# Patient Record
Sex: FEMALE | Race: WHITE | Employment: FULL TIME | URBAN - METROPOLITAN AREA
[De-identification: names, ages, dates, MRNs, and addresses within clinical notes are randomized per-mention and may not be internally consistent; named-entity substitution may affect disease eponyms.]

---

## 2017-09-07 ENCOUNTER — HOSPITAL ENCOUNTER (OUTPATIENT)
Dept: CT IMAGING | Age: 42
Discharge: HOME | End: 2017-09-07
Attending: INTERNAL MEDICINE
Payer: COMMERCIAL

## 2017-09-07 DIAGNOSIS — R91.1 PULMONARY NODULE: ICD-10-CM

## 2017-09-07 PROCEDURE — 71250 CT THORAX DX C-: CPT

## 2019-09-10 ENCOUNTER — HOSPITAL ENCOUNTER (OUTPATIENT)
Dept: CT IMAGING | Age: 44
Discharge: HOME | End: 2019-09-10
Attending: INTERNAL MEDICINE
Payer: COMMERCIAL

## 2019-09-10 DIAGNOSIS — R91.1 PULMONARY NODULE: ICD-10-CM

## 2019-09-10 PROCEDURE — 71250 CT THORAX DX C-: CPT

## 2020-09-14 ENCOUNTER — HOSPITAL ENCOUNTER (OUTPATIENT)
Dept: CT IMAGING | Age: 45
Discharge: HOME | End: 2020-09-14
Attending: INTERNAL MEDICINE
Payer: COMMERCIAL

## 2020-09-14 DIAGNOSIS — R91.1 PULMONARY NODULE: ICD-10-CM

## 2020-09-14 PROCEDURE — 71250 CT THORAX DX C-: CPT

## 2024-12-31 ENCOUNTER — TELEPHONE (OUTPATIENT)
Age: 49
End: 2024-12-31

## 2024-12-31 NOTE — TELEPHONE ENCOUNTER
Patient's mother, father and brother are patient's of Dr. Treviño.  (Lillian Mendoza, LISA Mendoza and Zbigniew Mendoza).  Patient would like to know if Dr. Treviño will accept her as a new patient.    Lillian Mendoza - [phone]

## 2025-01-01 ENCOUNTER — APPOINTMENT (OUTPATIENT)
Facility: HOSPITAL | Age: 50
End: 2025-01-01
Payer: COMMERCIAL

## 2025-01-01 ENCOUNTER — HOSPITAL ENCOUNTER (OUTPATIENT)
Facility: HOSPITAL | Age: 50
Setting detail: OBSERVATION
Discharge: HOME | End: 2025-01-02
Attending: STUDENT IN AN ORGANIZED HEALTH CARE EDUCATION/TRAINING PROGRAM | Admitting: FAMILY MEDICINE
Payer: COMMERCIAL

## 2025-01-01 DIAGNOSIS — R29.90 STROKE-LIKE SYMPTOMS: Primary | ICD-10-CM

## 2025-01-01 LAB
ALBUMIN SERPL-MCNC: 4 G/DL (ref 3.5–5)
ALBUMIN/GLOB SERPL: 1.3 (ref 1.1–2.2)
ALP SERPL-CCNC: 86 U/L (ref 45–117)
ALT SERPL-CCNC: 40 U/L (ref 12–78)
AST SERPL-CCNC: 31 U/L (ref 15–37)
BASOPHILS # BLD: 0 K/UL (ref 0–0.1)
BASOPHILS NFR BLD: 0 % (ref 0–1)
BILIRUB SERPL-MCNC: 0.4 MG/DL (ref 0.2–1)
BUN SERPL-MCNC: 17 MG/DL (ref 6–20)
BUN/CREAT SERPL: 20 (ref 12–20)
CALCIUM SERPL-MCNC: 9.6 MG/DL (ref 8.5–10.1)
CHLORIDE SERPL-SCNC: 104 MMOL/L (ref 97–108)
CO2 SERPL-SCNC: 30 MMOL/L (ref 21–32)
COMMENT:: NORMAL
CREAT SERPL-MCNC: 0.86 MG/DL (ref 0.55–1.02)
DIFFERENTIAL METHOD BLD: ABNORMAL
EOSINOPHIL NFR BLD: 1 % (ref 0–7)
ERYTHROCYTE [DISTWIDTH] IN BLOOD BY AUTOMATED COUNT: 11.9 % (ref 11.5–14.5)
GLUCOSE BLD STRIP.AUTO-MCNC: 134 MG/DL (ref 65–117)
GLUCOSE SERPL-MCNC: 124 MG/DL (ref 65–100)
HCT VFR BLD AUTO: 40 % (ref 35–47)
HGB BLD-MCNC: 13.5 G/DL (ref 11.5–16)
IMM GRANULOCYTES NFR BLD AUTO: 0 % (ref 0–0.5)
INR PPP: 1 (ref 0.9–1.1)
LYMPHOCYTES # BLD: 1.9 K/UL (ref 0.8–3.5)
LYMPHOCYTES NFR BLD: 31 % (ref 12–49)
MCH RBC QN AUTO: 29.9 PG (ref 26–34)
MCHC RBC AUTO-ENTMCNC: 33.8 G/DL (ref 30–36.5)
MCV RBC AUTO: 88.7 FL (ref 80–99)
MONOCYTES # BLD: 0.3 K/UL (ref 0–1)
MONOCYTES NFR BLD: 4 % (ref 5–13)
NEUTS SEG # BLD: 3.9 K/UL (ref 1.8–8)
NEUTS SEG NFR BLD: 64 % (ref 32–75)
NRBC # BLD: 0 K/UL (ref 0–0.01)
NRBC BLD-RTO: 0 PER 100 WBC
PMV BLD AUTO: 9.5 FL (ref 8.9–12.9)
POTASSIUM SERPL-SCNC: 3.6 MMOL/L (ref 3.5–5.1)
PROTHROMBIN TIME: 10.2 SEC (ref 9–11.1)
SERVICE CMNT-IMP: ABNORMAL
SODIUM SERPL-SCNC: 139 MMOL/L (ref 136–145)
SPECIMEN HOLD: NORMAL
T4 FREE SERPL-MCNC: 0.7 NG/DL (ref 0.8–1.5)
TROPONIN I SERPL HS-MCNC: 24 NG/L (ref 0–51)
TSH SERPL DL<=0.05 MIU/L-ACNC: 4.8 UIU/ML (ref 0.36–3.74)
WBC # BLD AUTO: 6.2 K/UL (ref 3.6–11)

## 2025-01-01 PROCEDURE — 84443 ASSAY THYROID STIM HORMONE: CPT

## 2025-01-01 PROCEDURE — APPNB45 APP NON BILLABLE 31-45 MINUTES

## 2025-01-01 PROCEDURE — 6360000004 HC RX CONTRAST MEDICATION: Performed by: RADIOLOGY

## 2025-01-01 PROCEDURE — 36415 COLL VENOUS BLD VENIPUNCTURE: CPT

## 2025-01-01 PROCEDURE — 70450 CT HEAD/BRAIN W/O DYE: CPT

## 2025-01-01 PROCEDURE — 84484 ASSAY OF TROPONIN QUANT: CPT

## 2025-01-01 PROCEDURE — 80053 COMPREHEN METABOLIC PANEL: CPT

## 2025-01-01 PROCEDURE — 0042T CT BRAIN PERFUSION: CPT

## 2025-01-01 PROCEDURE — 84439 ASSAY OF FREE THYROXINE: CPT

## 2025-01-01 PROCEDURE — 82962 GLUCOSE BLOOD TEST: CPT

## 2025-01-01 PROCEDURE — 99285 EMERGENCY DEPT VISIT HI MDM: CPT

## 2025-01-01 PROCEDURE — 70498 CT ANGIOGRAPHY NECK: CPT

## 2025-01-01 PROCEDURE — 85025 COMPLETE CBC W/AUTO DIFF WBC: CPT

## 2025-01-01 PROCEDURE — 85610 PROTHROMBIN TIME: CPT

## 2025-01-01 RX ORDER — VENLAFAXINE HYDROCHLORIDE 37.5 MG/1
37.5 CAPSULE, EXTENDED RELEASE ORAL DAILY
COMMUNITY

## 2025-01-01 RX ORDER — IOPAMIDOL 755 MG/ML
100 INJECTION, SOLUTION INTRAVASCULAR
Status: COMPLETED | OUTPATIENT
Start: 2025-01-01 | End: 2025-01-01

## 2025-01-01 RX ORDER — VENLAFAXINE HYDROCHLORIDE 75 MG/1
75 CAPSULE, EXTENDED RELEASE ORAL DAILY
COMMUNITY

## 2025-01-01 RX ORDER — GABAPENTIN 100 MG/1
100 CAPSULE ORAL 3 TIMES DAILY
COMMUNITY

## 2025-01-01 RX ORDER — TRAZODONE HYDROCHLORIDE 50 MG/1
50 TABLET, FILM COATED ORAL NIGHTLY
COMMUNITY

## 2025-01-01 RX ADMIN — IOPAMIDOL 20 ML: 755 INJECTION, SOLUTION INTRAVENOUS at 17:58

## 2025-01-01 RX ADMIN — IOPAMIDOL 100 ML: 755 INJECTION, SOLUTION INTRAVENOUS at 17:58

## 2025-01-01 ASSESSMENT — PAIN - FUNCTIONAL ASSESSMENT
PAIN_FUNCTIONAL_ASSESSMENT: NONE - DENIES PAIN
PAIN_FUNCTIONAL_ASSESSMENT: NONE - DENIES PAIN

## 2025-01-01 ASSESSMENT — ENCOUNTER SYMPTOMS
SHORTNESS OF BREATH: 0
ABDOMINAL PAIN: 0

## 2025-01-01 NOTE — ED TRIAGE NOTES
Pt reports garbled speech since 8:45 this am. Pt also reports numbness to L LE, dizziness, and nausea. Pt denies unilateral weakness.

## 2025-01-01 NOTE — PROGRESS NOTES
Code Stroke Documentation      Symptoms:  Right  weakness, worsening of intermittent chronic garbled speech, dizziness and nausea, numbness to left lower extremity that started 2 weeks ago   Baseline mRS:   1   Last Known Well:  2025 8:45, noted acute worsening of more chronic problems   Medical hx: Past Medical History:   Diagnosis Date    ADD (attention deficit disorder)     ADHD (attention deficit hyperactivity disorder) 2013    Allergic rhinitis, cause unspecified     Depression     Thyroid disease     thyroid nodule and s/p biopsy benign    Thyroid nodule 2013      Vitals: Vitals:    25 1743   BP: (!) 142/81   Pulse: 88   Resp: 18   Temp: 98 °F (36.7 °C)   SpO2: 100%      AC/APT:  None   VAN: Negative   NIHSS: 1a-LOC:0  1b-Month/Age:0  1c-Open/Close Hand:0  2-Best Gaze:0  3-Visual Fields:0  4-Facial Palsy:0  5a-Left Arm:0  5b-Right Arm:0  6a-Left Le  6b-Right Le  7-Limb Ataxia:0  8-Sensory:1  9-Best Language:1  10-Dysarthria:1  11-Extinction/Inattention:0  TOTAL SCORE:3   Imaging (personally reviewed): CT: No acute intracranial findings  CTA/CTP: No LVO, No flow limiting stenosis     Plan: tNK Candidate: NO  Mechanical thrombectomy Candidate: NO    *Perform dysphagia screening prior to any PO intake*     Discussed with: Patient, Dr. Tran - Teleneuro, Dr. Chavez - ED, Patient's family at the bedside, Primary RN    Arrival time: Met patient in CT 17:47pm    Patient has recently been treated for a thyroid storm and even though her baseline reflect some intermittent deficit with speech and weakness, as of 8:45 this morning symptoms of speech, sensory and weakness were significantly increased and concerning.      I have spent 35 of critical care time involved in lab review, consultations with specialist, family decision making and documentation. During this entire length of time I was immediately available to the patient.    Henny Grossman, BEA - NP  Neurovascular

## 2025-01-01 NOTE — ED PROVIDER NOTES
of this note:    CTA HEAD NECK W CONTRAST   Final Result   No large vessel occlusion, hemodynamically significant carotid stenosis, or   perfusion abnormality.      Electronically signed by AsetekSAIN      CT BRAIN PERFUSION   Final Result   No large vessel occlusion, hemodynamically significant carotid stenosis, or   perfusion abnormality.      Electronically signed by AsetekSAIN      CT HEAD WO CONTRAST   Final Result      1.   No acute intracranial findings.  Specifically, no acute intracranial   hemorrhage or visible acute infarct.            Electronically signed by Massimo Newsome           LABS:  Labs Reviewed   CBC WITH AUTO DIFFERENTIAL - Abnormal; Notable for the following components:       Result Value    Monocytes % 4 (*)     All other components within normal limits   COMPREHENSIVE METABOLIC PANEL - Abnormal; Notable for the following components:    Glucose 124 (*)     All other components within normal limits   POCT GLUCOSE - Abnormal; Notable for the following components:    POC Glucose 134 (*)     All other components within normal limits   PROTIME-INR   TROPONIN   EXTRA TUBES HOLD   TSH   T4, FREE   POCT GLUCOSE       All other labs were within normal range or not returned as of this dictation.    EMERGENCY DEPARTMENT COURSE and DIFFERENTIAL DIAGNOSIS/MDM:   Vitals:    Vitals:    01/01/25 1743   BP: (!) 142/81   Pulse: 88   Resp: 18   Temp: 98 °F (36.7 °C)   TempSrc: Tympanic   SpO2: 100%   Weight: 73.6 kg (162 lb 4.1 oz)           Medical Decision Making  Amount and/or Complexity of Data Reviewed  Labs: ordered.  Radiology: ordered.  ECG/medicine tests: ordered.    Risk  Decision regarding hospitalization.            REASSESSMENT     ED Course as of 01/01/25 1904 Wed Jan 01, 2025   1800 Spoke to Dr. Romain hdz [WG]   1819 Spoke to teleneurology once again.  Recommended admission MRI for further workup [WG]   1819 . [WG]      ED Course User Index  [WG] Aamir Chavez DO Perfect

## 2025-01-02 ENCOUNTER — APPOINTMENT (OUTPATIENT)
Facility: HOSPITAL | Age: 50
End: 2025-01-02
Attending: FAMILY MEDICINE
Payer: COMMERCIAL

## 2025-01-02 ENCOUNTER — APPOINTMENT (OUTPATIENT)
Facility: HOSPITAL | Age: 50
End: 2025-01-02
Payer: COMMERCIAL

## 2025-01-02 VITALS
RESPIRATION RATE: 13 BRPM | HEART RATE: 65 BPM | WEIGHT: 162.26 LBS | OXYGEN SATURATION: 100 % | SYSTOLIC BLOOD PRESSURE: 103 MMHG | DIASTOLIC BLOOD PRESSURE: 65 MMHG | TEMPERATURE: 98 F

## 2025-01-02 PROBLEM — R29.818 FOCAL NEUROLOGICAL DEFICIT: Status: ACTIVE | Noted: 2025-01-02

## 2025-01-02 LAB
ANION GAP SERPL CALC-SCNC: 5 MMOL/L (ref 2–12)
BUN SERPL-MCNC: 13 MG/DL (ref 6–20)
BUN/CREAT SERPL: 14 (ref 12–20)
CALCIUM SERPL-MCNC: 9.3 MG/DL (ref 8.5–10.1)
CHLORIDE SERPL-SCNC: 105 MMOL/L (ref 97–108)
CHOLEST SERPL-MCNC: 255 MG/DL
CO2 SERPL-SCNC: 31 MMOL/L (ref 21–32)
COMMENT:: NORMAL
CREAT SERPL-MCNC: 0.92 MG/DL (ref 0.55–1.02)
ECHO AV PEAK GRADIENT: 11 MMHG
ECHO AV PEAK VELOCITY: 1.6 M/S
ECHO AV VELOCITY RATIO: 1
ECHO LA DIAMETER: 2 CM
ECHO LV E' LATERAL VELOCITY: 13.32 CM/S
ECHO LV E' SEPTAL VELOCITY: 11.87 CM/S
ECHO LV EF PHYSICIAN: 60 %
ECHO LV FRACTIONAL SHORTENING: 22 % (ref 28–44)
ECHO LV INTERNAL DIMENSION DIASTOLIC: 4.1 CM (ref 3.9–5.3)
ECHO LV INTERNAL DIMENSION SYSTOLIC: 3.2 CM
ECHO LV IVSD: 0.5 CM (ref 0.6–0.9)
ECHO LV MASS 2D: 60.2 G (ref 67–162)
ECHO LV POSTERIOR WALL DIASTOLIC: 0.6 CM (ref 0.6–0.9)
ECHO LV RELATIVE WALL THICKNESS RATIO: 0.29
ECHO LVOT PEAK GRADIENT: 10 MMHG
ECHO LVOT PEAK VELOCITY: 1.6 M/S
ECHO MV A VELOCITY: 0.45 M/S
ECHO MV E VELOCITY: 0.9 M/S
ECHO MV E/A RATIO: 2
ECHO MV E/E' LATERAL: 6.76
ECHO MV E/E' RATIO (AVERAGED): 7.17
ECHO MV E/E' SEPTAL: 7.58
EKG ATRIAL RATE: 72 BPM
EKG DIAGNOSIS: NORMAL
EKG P AXIS: 79 DEGREES
EKG P-R INTERVAL: 164 MS
EKG Q-T INTERVAL: 408 MS
EKG QRS DURATION: 88 MS
EKG QTC CALCULATION (BAZETT): 446 MS
EKG R AXIS: 80 DEGREES
EKG T AXIS: 78 DEGREES
EKG VENTRICULAR RATE: 72 BPM
EST. AVERAGE GLUCOSE BLD GHB EST-MCNC: 111 MG/DL
GLUCOSE SERPL-MCNC: 101 MG/DL (ref 65–100)
HBA1C MFR BLD: 5.5 % (ref 4–5.6)
HDLC SERPL-MCNC: 132 MG/DL
HDLC SERPL: 1.9 (ref 0–5)
LDLC SERPL CALC-MCNC: 115.2 MG/DL (ref 0–100)
POTASSIUM SERPL-SCNC: 3.8 MMOL/L (ref 3.5–5.1)
SODIUM SERPL-SCNC: 141 MMOL/L (ref 136–145)
SPECIMEN HOLD: NORMAL
TRIGL SERPL-MCNC: 39 MG/DL
VLDLC SERPL CALC-MCNC: 7.8 MG/DL

## 2025-01-02 PROCEDURE — G0378 HOSPITAL OBSERVATION PER HR: HCPCS

## 2025-01-02 PROCEDURE — 6370000000 HC RX 637 (ALT 250 FOR IP): Performed by: FAMILY MEDICINE

## 2025-01-02 PROCEDURE — 2500000003 HC RX 250 WO HCPCS: Performed by: FAMILY MEDICINE

## 2025-01-02 PROCEDURE — 80061 LIPID PANEL: CPT

## 2025-01-02 PROCEDURE — 36415 COLL VENOUS BLD VENIPUNCTURE: CPT

## 2025-01-02 PROCEDURE — 6370000000 HC RX 637 (ALT 250 FOR IP): Performed by: STUDENT IN AN ORGANIZED HEALTH CARE EDUCATION/TRAINING PROGRAM

## 2025-01-02 PROCEDURE — 93005 ELECTROCARDIOGRAM TRACING: CPT

## 2025-01-02 PROCEDURE — 83036 HEMOGLOBIN GLYCOSYLATED A1C: CPT

## 2025-01-02 PROCEDURE — 70551 MRI BRAIN STEM W/O DYE: CPT

## 2025-01-02 PROCEDURE — 99222 1ST HOSP IP/OBS MODERATE 55: CPT | Performed by: PSYCHIATRY & NEUROLOGY

## 2025-01-02 PROCEDURE — 80048 BASIC METABOLIC PNL TOTAL CA: CPT

## 2025-01-02 PROCEDURE — 92610 EVALUATE SWALLOWING FUNCTION: CPT

## 2025-01-02 PROCEDURE — 93306 TTE W/DOPPLER COMPLETE: CPT

## 2025-01-02 RX ORDER — VENLAFAXINE HYDROCHLORIDE 37.5 MG/1
37.5 CAPSULE, EXTENDED RELEASE ORAL NIGHTLY
Status: DISCONTINUED | OUTPATIENT
Start: 2025-01-02 | End: 2025-01-02 | Stop reason: HOSPADM

## 2025-01-02 RX ORDER — SODIUM CHLORIDE 0.9 % (FLUSH) 0.9 %
5-40 SYRINGE (ML) INJECTION PRN
Status: DISCONTINUED | OUTPATIENT
Start: 2025-01-02 | End: 2025-01-02 | Stop reason: HOSPADM

## 2025-01-02 RX ORDER — ONDANSETRON 4 MG/1
4 TABLET, ORALLY DISINTEGRATING ORAL EVERY 8 HOURS PRN
Status: DISCONTINUED | OUTPATIENT
Start: 2025-01-02 | End: 2025-01-02 | Stop reason: HOSPADM

## 2025-01-02 RX ORDER — TRAZODONE HYDROCHLORIDE 50 MG/1
50 TABLET, FILM COATED ORAL NIGHTLY
Status: DISCONTINUED | OUTPATIENT
Start: 2025-01-02 | End: 2025-01-02 | Stop reason: HOSPADM

## 2025-01-02 RX ORDER — ASPIRIN 81 MG/1
81 TABLET, CHEWABLE ORAL DAILY
Status: DISCONTINUED | OUTPATIENT
Start: 2025-01-02 | End: 2025-01-02 | Stop reason: HOSPADM

## 2025-01-02 RX ORDER — POLYETHYLENE GLYCOL 3350 17 G/17G
17 POWDER, FOR SOLUTION ORAL DAILY PRN
Status: DISCONTINUED | OUTPATIENT
Start: 2025-01-02 | End: 2025-01-02 | Stop reason: HOSPADM

## 2025-01-02 RX ORDER — ATORVASTATIN CALCIUM 40 MG/1
80 TABLET, FILM COATED ORAL NIGHTLY
Status: DISCONTINUED | OUTPATIENT
Start: 2025-01-02 | End: 2025-01-02 | Stop reason: HOSPADM

## 2025-01-02 RX ORDER — ONDANSETRON 2 MG/ML
4 INJECTION INTRAMUSCULAR; INTRAVENOUS EVERY 6 HOURS PRN
Status: DISCONTINUED | OUTPATIENT
Start: 2025-01-02 | End: 2025-01-02 | Stop reason: HOSPADM

## 2025-01-02 RX ORDER — VENLAFAXINE HYDROCHLORIDE 37.5 MG/1
75 CAPSULE, EXTENDED RELEASE ORAL DAILY
Status: DISCONTINUED | OUTPATIENT
Start: 2025-01-02 | End: 2025-01-02 | Stop reason: HOSPADM

## 2025-01-02 RX ORDER — TRAZODONE HYDROCHLORIDE 50 MG/1
50 TABLET, FILM COATED ORAL NIGHTLY
Status: DISCONTINUED | OUTPATIENT
Start: 2025-01-02 | End: 2025-01-02

## 2025-01-02 RX ORDER — ASPIRIN 300 MG/1
300 SUPPOSITORY RECTAL DAILY
Status: DISCONTINUED | OUTPATIENT
Start: 2025-01-02 | End: 2025-01-02 | Stop reason: HOSPADM

## 2025-01-02 RX ORDER — SODIUM CHLORIDE 9 MG/ML
INJECTION, SOLUTION INTRAVENOUS PRN
Status: DISCONTINUED | OUTPATIENT
Start: 2025-01-02 | End: 2025-01-02 | Stop reason: HOSPADM

## 2025-01-02 RX ORDER — SODIUM CHLORIDE 0.9 % (FLUSH) 0.9 %
5-40 SYRINGE (ML) INJECTION EVERY 12 HOURS SCHEDULED
Status: DISCONTINUED | OUTPATIENT
Start: 2025-01-02 | End: 2025-01-02 | Stop reason: HOSPADM

## 2025-01-02 RX ADMIN — VENLAFAXINE HYDROCHLORIDE 75 MG: 37.5 CAPSULE, EXTENDED RELEASE ORAL at 10:11

## 2025-01-02 RX ADMIN — TRAZODONE HYDROCHLORIDE 50 MG: 50 TABLET ORAL at 01:48

## 2025-01-02 RX ADMIN — Medication 10 ML: at 08:38

## 2025-01-02 RX ADMIN — ASPIRIN 81 MG CHEWABLE TABLET 81 MG: 81 TABLET CHEWABLE at 08:38

## 2025-01-02 ASSESSMENT — PAIN SCALES - GENERAL: PAINLEVEL_OUTOF10: 0

## 2025-01-02 NOTE — DISCHARGE INSTRUCTIONS
Discharge Instructions       PATIENT ID: Lillian Mendoza  MRN: [id number]   YOB: 1975    DATE OF ADMISSION: 1/1/2025   DATE OF DISCHARGE: 1/2/2025    PRIMARY CARE PROVIDER: No primary care provider on file.     ATTENDING PHYSICIAN: John Bueno MD   DISCHARGING PROVIDER: BEA Andino NP    To contact this individual call [phone] and ask the  to page.   If unavailable ask to be transferred the Adult Hospitalist Department.    DISCHARGE DIAGNOSES speech difficulty, right arm weakness, left leg numbness    CONSULTATIONS: Neurology    PROCEDURES/SURGERIES: * No surgery found *    PENDING TEST RESULTS:   At the time of discharge the following test results are still pending: none    Follow-up Information       Follow up With Specialties Details Why Contact Info    Gato Quinn MD Neurology Follow up  [address] [facility] [address] [phone]      U Endocrinology  Follow up Go to/schedule follow up as previously directed.     Sujatha Spann MD Psychiatry Follow up Schedule hospital follow up. [address] [facility] [place]  [address] [place] [id number] [phone]      PCP  Follow up Please call to schedule hospital follow up in 1 week. You should speak about your elevated cholesterol levels.             ADDITIONAL CARE RECOMMENDATIONS:   Schedule follow up appointments as listed above.  If you develop chest pain, shortness of breath, or any signs/symptoms of stroke, you should be seen in the emergency department.  Your cholesterol levels were elevated on your lab work. We spoke about lifestyle changes with PCP follow up versus starting a statin for cholesterol - you prefer to discuss further with your PCP. Please do so at your hospital follow up appointment.     DIET: cardiac diet    ACTIVITY: activity as tolerated    WOUND CARE: none    EQUIPMENT needed: none      DISCHARGE MEDICATIONS:   See Medication Reconciliation Form    It is important that you take

## 2025-01-02 NOTE — PROGRESS NOTES
Occupational Therapy  01/02/25    Chart review completed in preparation for OT eval. Per RN, pt is on the way to MRI. Will defer and follow up as able and appropriate.     Thank you  Cande Elizondo, OTR/L

## 2025-01-02 NOTE — DISCHARGE SUMMARY
Discharge Summary       PATIENT ID: Lililan Mendoza  MRN: [id number]   YOB: 1975    DATE OF ADMISSION: 1/1/2025  5:48 PM    DATE OF DISCHARGE: 1/2/2025   PRIMARY CARE PROVIDER: No primary care provider on file.     ATTENDING PHYSICIAN: Dr. Bueno  DISCHARGING PROVIDER: BEA Andino NP    To contact this individual call [phone] and ask the  to page.  If unavailable ask to be transferred the Adult Hospitalist Department.    CONSULTATIONS: IP CONSULT TO TELE-NEUROLOGY  IP CONSULT TO HOSPITALIST  IP CONSULT TO NEUROLOGY  IP CONSULT TO CASE MANAGEMENT  IP CONSULT TO STROKE COORDINATOR    PROCEDURES/SURGERIES: * No surgery found *     ADMITTING DIAGNOSES & HOSPITAL COURSE:   Per H&P: Lillian Mendoza is a 49 y.o. female with a pmhx left endarterectomy, depression, thyrotoxicosis cognitive difficulties and reversible vasoconstriction syndrome who presents with speech difficulty, right arm weakness, and left leg numbness for the past several weeks.  Her sx worsened to the point this morning where she couldn't even lift utensils so she came to the ED for further care.     She last saw [facility] neurology in 2020 for similar episodes.     In the ED, VSS.  Labs showed TSH 4.8.  CT head, CTA head/neck, and CT brain perfusion were negative    1/2: Seen by Neurology - symptoms not suggestive of TIA/CVA, cleared for discharge home. Medically stable at time of discharge.     DISCHARGE DIAGNOSES / PLAN:      Focal neurologic deficit  Hx left endarterectomy  Hx reversible vasoconstriction syndrome  - head CT, CT brain perfusion, head neck CTA, brain MRI without acute findings  - echo: EF 60-65%, negative agitated saline study  - A1c 5.5  - PT/OT/speech consulted   - established with Dr. Chilel ([facility]), will need outpatient follow up  - orthostatic vitals negative    Hyperlipidemia  - total cholesterol 255,   - discussed lifestyle changes versus initiation of statin - patient prefers to discuss

## 2025-01-02 NOTE — CARE COORDINATION
Attending NP consulted this CM to talk with pt. Pt had questions regarding her short term disability benefits. Pt shared her current struggles with STD. Pt described a process that is incremental, tedious, and ultimately not providing approval for continuing STD coverage even with medical provider endorsement of ongoing disabling cognitive symptoms. After two months, with tedious monthly incremental qualification, her STD was denied.     Pt described worsening of symptoms as a result of the tediousness and stress of the monthly qualifications, lack of follow through on the part of disability firm and insurance company. Pt had already pleaded her concerns in a lengthy, thoughtful, and example laden letter to the ethics committee/division of her employer. Pt received an email response today while in the hospital that did not support her request for support and assistance. Given pt's attempts to work through all the requests required of her and an appeal to the ethics team this writer researched options for pt advocacy related to denied disability leave. This writer provided pt with resource information for [employer] ([employer]). PAF has a goal of providing case management support to reduce the practical and financial burden for patients so they can focus on getting better, including fighting insurance denials. .     Pt and pt's mother appreciative of assistance.     NAM Rodriguez    [facility]    or by [employer]

## 2025-01-02 NOTE — H&P
History and Physical    Date of Service:  1/2/2025  Primary Care Provider: No primary care provider on file.  Source of information: patient, electronic medical record    Chief Complaint: Speech Problem and Numbness      History of Presenting Illness:   Lillian Mendoza is a 49 y.o. female with a pmhx left endarterectomy, depression, thyrotoxicosis cognitive difficulties and reversible vasoconstriction  syndromewho presents with speech difficulty, right are weakness, and left leg numbness for the past several weeks.  Her sx worsened to the point this morning where she couldn't even lift utensils so she came to the ED for further care.    She last saw [facility] neurology in 2020 for similar episodes.    In the ED, VSS.  Labs showed TSH 4.8.  CT head, CTA head/neck, and CT brain perfusion were negative       REVIEW OF SYSTEMS:  A comprehensive review of systems was negative except for that written in the History of Present Illness.     Past Medical History:   Diagnosis Date    ADD (attention deficit disorder)     ADHD (attention deficit hyperactivity disorder) 5/31/2013    Allergic rhinitis, cause unspecified     Depression     Thyroid disease 2005    thyroid nodule and s/p biopsy benign    Thyroid nodule 6/7/2013      Past Surgical History:   Procedure Laterality Date    BREAST SURGERY  [id number]    right breast biopsy    BREAST SURGERY  1-20-11    breast cysts     HEENT      adenoids and tubes    WISDOM TOOTH EXTRACTION       Prior to Admission medications    Medication Sig Start Date End Date Taking? Authorizing Provider   venlafaxine (EFFEXOR XR) 37.5 MG extended release capsule Take 1 capsule by mouth daily   Yes Larissa Narayan MD   venlafaxine (EFFEXOR XR) 75 MG extended release capsule Take 1 capsule by mouth daily   Yes ProviderLarissa MD   traZODone (DESYREL) 50 MG tablet Take 1 tablet by mouth nightly   Yes ProviderLarissa MD   gabapentin (NEURONTIN) 100 MG capsule Take 1 capsule by

## 2025-01-02 NOTE — CONSULTS
Neurology Consult Note     NAME: Lillian Mendoza   :  1975   MRN:  [id number]   DATE:  2025       HPI:  Pt is a 48yo RH female admitted 22 with various complaints. She has had intermittent numbness in left foot for the last 1-2 weeks, initially though it was just falling asleep or her shoe was too tight, but then touched her left lower lateral leg and realized it is slightly numb.  She has also had \"garbled\" speech intermittently for the last 3-4 weeks, typically when trying to read out loud to her boyfriend or mom, but now even just when having a conversation, worsened yesterday AM.  Mom has heard this and describes it as \"halting\" speech, able to understand what the pt is staying, but she has to sound out longer words syllable by syllable, or may mispronounce a word.  She continues to have this halting speech during our conversation. Additionally, has noticed fingers on right hand are curling, but she can correct it, has trouble gripping.  Other complaints that Mom has kept a list of include nausea, lightheadedness, with leaning down to tie her shoes she feels \"heavy,\" she felt like the \"blood flow from her neck to her head was not right,\" feels tense and anxious, it has been an effort to think.  She acknowledges a low grade HA today, but none yesterday. No recent illness.  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  She is short term disability from [employer] due to these cognitive issues and they feel the insurance company is contributing to her slow recovery.  She is followed at [facility], Dr. Quinn, last seen 10/21/24. She sees a

## 2025-02-07 SDOH — ECONOMIC STABILITY: TRANSPORTATION INSECURITY
IN THE PAST 12 MONTHS, HAS THE LACK OF TRANSPORTATION KEPT YOU FROM MEDICAL APPOINTMENTS OR FROM GETTING MEDICATIONS?: PATIENT DECLINED

## 2025-02-07 SDOH — ECONOMIC STABILITY: INCOME INSECURITY: IN THE LAST 12 MONTHS, WAS THERE A TIME WHEN YOU WERE NOT ABLE TO PAY THE MORTGAGE OR RENT ON TIME?: PATIENT DECLINED

## 2025-02-07 SDOH — ECONOMIC STABILITY: FOOD INSECURITY: WITHIN THE PAST 12 MONTHS, THE FOOD YOU BOUGHT JUST DIDN'T LAST AND YOU DIDN'T HAVE MONEY TO GET MORE.: PATIENT DECLINED

## 2025-02-07 SDOH — ECONOMIC STABILITY: TRANSPORTATION INSECURITY
IN THE PAST 12 MONTHS, HAS LACK OF TRANSPORTATION KEPT YOU FROM MEETINGS, WORK, OR FROM GETTING THINGS NEEDED FOR DAILY LIVING?: PATIENT DECLINED

## 2025-02-07 SDOH — ECONOMIC STABILITY: FOOD INSECURITY: WITHIN THE PAST 12 MONTHS, YOU WORRIED THAT YOUR FOOD WOULD RUN OUT BEFORE YOU GOT MONEY TO BUY MORE.: PATIENT DECLINED

## 2025-02-10 ENCOUNTER — OFFICE VISIT (OUTPATIENT)
Age: 50
End: 2025-02-10
Payer: COMMERCIAL

## 2025-02-10 VITALS
SYSTOLIC BLOOD PRESSURE: 126 MMHG | RESPIRATION RATE: 15 BRPM | OXYGEN SATURATION: 100 % | HEIGHT: 72 IN | HEART RATE: 65 BPM | WEIGHT: 162.4 LBS | TEMPERATURE: 98.8 F | BODY MASS INDEX: 22 KG/M2 | DIASTOLIC BLOOD PRESSURE: 76 MMHG

## 2025-02-10 DIAGNOSIS — R29.818 FOCAL NEUROLOGICAL DEFICIT: ICD-10-CM

## 2025-02-10 DIAGNOSIS — I95.9 HYPOTENSION, UNSPECIFIED HYPOTENSION TYPE: ICD-10-CM

## 2025-02-10 DIAGNOSIS — F32.A DEPRESSION, UNSPECIFIED DEPRESSION TYPE: ICD-10-CM

## 2025-02-10 DIAGNOSIS — E03.9 ACQUIRED HYPOTHYROIDISM: Primary | ICD-10-CM

## 2025-02-10 DIAGNOSIS — F90.9 ATTENTION DEFICIT HYPERACTIVITY DISORDER (ADHD), UNSPECIFIED ADHD TYPE: ICD-10-CM

## 2025-02-10 PROCEDURE — 99204 OFFICE O/P NEW MOD 45 MIN: CPT | Performed by: INTERNAL MEDICINE

## 2025-02-10 RX ORDER — AMOXICILLIN 500 MG
CAPSULE ORAL
COMMUNITY

## 2025-02-10 RX ORDER — HUPERZINE SERRATE A 1 %
POWDER (GRAM) MISCELLANEOUS
COMMUNITY

## 2025-02-10 RX ORDER — MULTIVIT WITH MINERALS/LUTEIN
1000 TABLET ORAL DAILY
COMMUNITY

## 2025-02-10 RX ORDER — ACETYLCARNITINE HCL 100 %
POWDER (GRAM) MISCELLANEOUS
COMMUNITY

## 2025-02-10 RX ORDER — AMPICILLIN TRIHYDRATE 250 MG
CAPSULE ORAL
COMMUNITY

## 2025-02-10 RX ORDER — LEVOTHYROXINE SODIUM 75 UG/1
75 TABLET ORAL DAILY
COMMUNITY
Start: 2025-01-28

## 2025-02-10 ASSESSMENT — PATIENT HEALTH QUESTIONNAIRE - PHQ9
3. TROUBLE FALLING OR STAYING ASLEEP: NOT AT ALL
4. FEELING TIRED OR HAVING LITTLE ENERGY: MORE THAN HALF THE DAYS
SUM OF ALL RESPONSES TO PHQ QUESTIONS 1-9: 8
2. FEELING DOWN, DEPRESSED OR HOPELESS: NOT AT ALL
8. MOVING OR SPEAKING SO SLOWLY THAT OTHER PEOPLE COULD HAVE NOTICED. OR THE OPPOSITE, BEING SO FIGETY OR RESTLESS THAT YOU HAVE BEEN MOVING AROUND A LOT MORE THAN USUAL: MORE THAN HALF THE DAYS
6. FEELING BAD ABOUT YOURSELF - OR THAT YOU ARE A FAILURE OR HAVE LET YOURSELF OR YOUR FAMILY DOWN: NOT AT ALL
7. TROUBLE CONCENTRATING ON THINGS, SUCH AS READING THE NEWSPAPER OR WATCHING TELEVISION: NEARLY EVERY DAY
SUM OF ALL RESPONSES TO PHQ9 QUESTIONS 1 & 2: 0
SUM OF ALL RESPONSES TO PHQ QUESTIONS 1-9: 8
SUM OF ALL RESPONSES TO PHQ QUESTIONS 1-9: 8
10. IF YOU CHECKED OFF ANY PROBLEMS, HOW DIFFICULT HAVE THESE PROBLEMS MADE IT FOR YOU TO DO YOUR WORK, TAKE CARE OF THINGS AT HOME, OR GET ALONG WITH OTHER PEOPLE: VERY DIFFICULT
SUM OF ALL RESPONSES TO PHQ QUESTIONS 1-9: 8
5. POOR APPETITE OR OVEREATING: SEVERAL DAYS
9. THOUGHTS THAT YOU WOULD BE BETTER OFF DEAD, OR OF HURTING YOURSELF: NOT AT ALL
1. LITTLE INTEREST OR PLEASURE IN DOING THINGS: NOT AT ALL

## 2025-02-10 NOTE — PROGRESS NOTES
mouth   Yes Larissa Narayan MD   Coenzyme Q10 (COQ10) 200 MG CAPS Take by mouth   Yes Larissa Narayan MD   Acetylcarnitine HCl (ACETYL-L-CARNITINE HCL) POWD by Does not apply route   Yes Larissa Narayan MD   Huhorace Serrate A 1 % POWD by Does not apply route   Yes Larissa Narayan MD   NONFORMULARY Theracurmin   Yes Larissa Narayan MD   NONFORMULARY Cocoavia   Yes Larissa Narayan MD   Ascorbic Acid (VITAMIN C) 1000 MG tablet Take 1 tablet by mouth daily   Yes Larissa Narayan MD   NONFORMULARY Balance of Nature Veggies and Fruits   Yes Larissa Narayan MD   venlafaxine (EFFEXOR XR) 37.5 MG extended release capsule Take 1 capsule by mouth daily   Yes Larissa Narayan MD   venlafaxine (EFFEXOR XR) 75 MG extended release capsule Take 1 capsule by mouth daily   Yes Larissa Narayan MD   traZODone (DESYREL) 50 MG tablet Take 1 tablet by mouth nightly   Yes Larissa Narayan MD   gabapentin (NEURONTIN) 100 MG capsule Take 1 capsule by mouth as needed.   Yes Larissa Narayan MD   fexofenadine (ALLEGRA) 180 MG tablet Take 1 tablet by mouth daily 4/11/15  Yes Automatic Reconciliation, Ar   methylphenidate (RITALIN) 20 MG tablet Take 1-1.5 tablets by mouth 2 times daily.   Yes Automatic Reconciliation, Ar   olopatadine (PATADAY) 0.2 % SOLN ophthalmic solution Apply 1 drop to eye daily   Yes Automatic Reconciliation, Ar       Social History     Socioeconomic History    Marital status: Single     Spouse name: Not on file    Number of children: Not on file    Years of education: Not on file    Highest education level: Not on file   Occupational History    Not on file   Tobacco Use    Smoking status: Never    Smokeless tobacco: Never   Substance and Sexual Activity    Alcohol use: Yes     Alcohol/week: 2.5 standard drinks of alcohol    Drug use: No    Sexual activity: Not on file   Other Topics Concern    Not on file   Social History Narrative    Not on file

## 2025-04-03 ENCOUNTER — HOSPITAL ENCOUNTER (OUTPATIENT)
Facility: HOSPITAL | Age: 50
Discharge: HOME | End: 2025-04-03
Attending: INTERNAL MEDICINE
Payer: COMMERCIAL

## 2025-04-03 DIAGNOSIS — R91.1 PULMONARY NODULE: ICD-10-CM

## 2025-04-03 PROCEDURE — 71250 CT THORAX DX C-: CPT

## 2025-05-05 ENCOUNTER — OFFICE VISIT (OUTPATIENT)
Age: 50
End: 2025-05-05
Payer: COMMERCIAL

## 2025-05-05 VITALS
TEMPERATURE: 98.1 F | OXYGEN SATURATION: 99 % | HEART RATE: 68 BPM | HEIGHT: 72 IN | WEIGHT: 159.6 LBS | RESPIRATION RATE: 15 BRPM | BODY MASS INDEX: 21.62 KG/M2 | DIASTOLIC BLOOD PRESSURE: 62 MMHG | SYSTOLIC BLOOD PRESSURE: 107 MMHG

## 2025-05-05 DIAGNOSIS — S39.012A STRAIN OF LUMBAR REGION, INITIAL ENCOUNTER: ICD-10-CM

## 2025-05-05 DIAGNOSIS — H60.501 ACUTE OTITIS EXTERNA OF RIGHT EAR, UNSPECIFIED TYPE: Primary | ICD-10-CM

## 2025-05-05 PROCEDURE — 99213 OFFICE O/P EST LOW 20 MIN: CPT | Performed by: INTERNAL MEDICINE

## 2025-05-05 RX ORDER — FLUTICASONE PROPIONATE 50 MCG
2 SPRAY, SUSPENSION (ML) NASAL DAILY
COMMUNITY
Start: 2025-05-05

## 2025-05-05 RX ORDER — NEOMYCIN SULFATE, POLYMYXIN B SULFATE, HYDROCORTISONE 3.5; 10000; 1 MG/ML; [USP'U]/ML; MG/ML
4 SOLUTION/ DROPS AURICULAR (OTIC) 3 TIMES DAILY
Qty: 10 ML | Refills: 0 | Status: SHIPPED | OUTPATIENT
Start: 2025-05-05 | End: 2025-05-15

## 2025-05-05 RX ORDER — CHOLECALCIFEROL (VITAMIN D3) 125 MCG
CAPSULE ORAL 2 TIMES DAILY
COMMUNITY

## 2025-05-05 NOTE — PROGRESS NOTES
HPI:  Lillian Mendoza is a 49 y.o. year old female who is here for a several day history of pain in the right ear.  It seems to wax and wane.  No trauma that she is aware of.  Some chronic nasal congestion and postnasal drip.  No fevers or chills.  No sweats.  No change in bowel or bladder habits.      Past Medical History:   Diagnosis Date    ADD (attention deficit disorder)     ADHD (attention deficit hyperactivity disorder) 5/31/2013    Allergic rhinitis, cause unspecified     Anxiety     Depression     Hypothyroidism 1/01/2025    Thyroid disease 2005    thyroid nodule and s/p biopsy benign    Thyroid nodule 6/7/2013       Past Surgical History:   Procedure Laterality Date    BREAST SURGERY  [id number]    right breast biopsy    BREAST SURGERY  1-20-11    breast cysts     COLONOSCOPY  3/07/23, 2/01/18    Repeat in 10 years    COLPOSCOPY      HEENT      adenoids and tubes    PAROTIDECTOMY Left 07/2015    WISDOM TOOTH EXTRACTION         Prior to Admission medications    Medication Sig Start Date End Date Taking? Authorizing Provider   L-Theanine 200 MG CAPS Take by mouth in the morning and at bedtime   Yes Larissa Narayan MD   fluticasone (FLONASE) 50 MCG/ACT nasal spray 2 sprays by Each Nostril route daily 5/5/25  Yes Vikas Treviño MD   neomycin-polymyxin-hydrocortisone (CORTISPORIN) 3.5-93196-0 otic solution Place 4 drops into the right ear 3 times daily for 10 days Instill into right Ear 5/5/25 5/15/25 Yes Vikas Treviño MD   levothyroxine (SYNTHROID) 75 MCG tablet Take 1 tablet by mouth Daily 1/28/25  Yes Larissa Narayan MD   Omega-3 Fatty Acids (FISH OIL) 1200 MG CAPS Take by mouth   Yes Larissa Narayan MD   MAGNESIUM CITRATE PO Take 250 mg by mouth   Yes Larissa Narayan MD   Coenzyme Q10 (COQ10) 200 MG CAPS Take by mouth   Yes Larissa Narayan MD   Acetylcarnitine HCl (ACETYL-L-CARNITINE HCL) POWD by Does not apply route   Yes Larissa Narayan MD Huperzine Serrate A 1 %